# Patient Record
Sex: MALE | Race: WHITE | NOT HISPANIC OR LATINO | Employment: OTHER | URBAN - METROPOLITAN AREA
[De-identification: names, ages, dates, MRNs, and addresses within clinical notes are randomized per-mention and may not be internally consistent; named-entity substitution may affect disease eponyms.]

---

## 2017-02-22 RX ORDER — DULOXETIN HYDROCHLORIDE 60 MG/1
60 CAPSULE, DELAYED RELEASE ORAL DAILY
COMMUNITY

## 2017-02-22 RX ORDER — CELECOXIB 200 MG/1
200 CAPSULE ORAL DAILY
COMMUNITY

## 2017-02-22 RX ORDER — GABAPENTIN 800 MG/1
800 TABLET ORAL 3 TIMES DAILY
COMMUNITY

## 2017-02-22 RX ORDER — LEVOTHYROXINE SODIUM 0.12 MG/1
125 TABLET ORAL DAILY
COMMUNITY

## 2017-02-22 RX ORDER — BACLOFEN 10 MG/1
10 TABLET ORAL 3 TIMES DAILY
COMMUNITY

## 2017-02-22 RX ORDER — HYDROCODONE BITARTRATE AND ACETAMINOPHEN 7.5; 325 MG/1; MG/1
1 TABLET ORAL EVERY 6 HOURS PRN
COMMUNITY

## 2017-02-22 RX ORDER — SILDENAFIL 50 MG/1
50 TABLET, FILM COATED ORAL DAILY PRN
COMMUNITY

## 2017-02-22 RX ORDER — ASPIRIN 325 MG
325 TABLET ORAL DAILY
COMMUNITY

## 2017-02-22 RX ORDER — ATORVASTATIN CALCIUM 40 MG/1
40 TABLET, FILM COATED ORAL DAILY
COMMUNITY

## 2017-02-23 ENCOUNTER — OFFICE VISIT (OUTPATIENT)
Dept: NEUROSURGERY | Facility: CLINIC | Age: 57
End: 2017-02-23

## 2017-02-23 VITALS
WEIGHT: 180 LBS | HEIGHT: 74 IN | BODY MASS INDEX: 23.1 KG/M2 | DIASTOLIC BLOOD PRESSURE: 80 MMHG | SYSTOLIC BLOOD PRESSURE: 126 MMHG | TEMPERATURE: 97.4 F | HEART RATE: 73 BPM

## 2017-02-23 DIAGNOSIS — M79.671 RIGHT FOOT PAIN: ICD-10-CM

## 2017-02-23 DIAGNOSIS — M47.817 LUMBOSACRAL SPONDYLOSIS WITHOUT MYELOPATHY: Primary | ICD-10-CM

## 2017-02-23 DIAGNOSIS — M25.551 RIGHT HIP PAIN: ICD-10-CM

## 2017-02-23 DIAGNOSIS — G89.29 CHRONIC BILATERAL LOW BACK PAIN WITHOUT SCIATICA: ICD-10-CM

## 2017-02-23 DIAGNOSIS — M54.50 CHRONIC BILATERAL LOW BACK PAIN WITHOUT SCIATICA: ICD-10-CM

## 2017-02-23 PROCEDURE — 99244 OFF/OP CNSLTJ NEW/EST MOD 40: CPT | Performed by: NEUROLOGICAL SURGERY

## 2017-02-23 RX ORDER — OXYCODONE HYDROCHLORIDE AND ACETAMINOPHEN 5; 325 MG/1; MG/1
TABLET ORAL
Refills: 0 | COMMUNITY
Start: 2017-02-15

## 2017-02-23 NOTE — PROGRESS NOTES
Patient: Zaid Olivo Jr.  :  1960  Chart #:  [id number]    Date of Service: 17    Chief Complaint:   Chief Complaint   Patient presents with   • Back Pain       Back Pain   Chronicity: Patient is new with me today.  He is a pleasant 56 year old male who presents today with low back pain. The current episode started more than 1 year ago (He has had low back pain for the last 4 years.). The problem occurs constantly (He states his back hurts constantly.). The problem has been gradually worsening since onset. The pain is present in the lumbar spine (The top of his right foot is broken.). The quality of the pain is described as shooting and aching (He has numbness in his right lower extremity that comes and goes.). Radiates to: He complains of right hip pain, and he states that the pain will run down his right lower extremity into the foot. The pain is at a severity of 6/10 (The orthopedic surgeon told him that his right leg was a little shorter than the left.). The pain is mild (His pain is moderate to severe.). The pain is the same all the time. The symptoms are aggravated by sitting, standing and position (His pain increases when he is ambulatory.). Stiffness is present: He has less sensation in the right lower extremity.  He has pain when his lower extremities are raised. Associated symptoms include numbness. Risk factors include sedentary lifestyle and lack of exercise (He has arthritis in his feet.  Currently patient has a boot on h is right foot.). He has tried bed rest (He has not been to physical therapy with little relief.  He has not tried heat or ice.) for the symptoms. The treatment provided mild relief.     He has had low back pain for 4 years;  He has aching pain in the lumbar area constantly;  He has never had lumbar spine surgery;  He has R hip pain and has been diagnosed with a labral tear;  He has had multiple surgeries on the R foot and is currently in a boot.  He c/o numbness  intermittently in the R leg from the thigh distally;  He uses celebrex, gabapentin and percocet.  He has seen spine surgeons in the past who did not recommend surgery.    Radiographic Images:   MRI of the lumbar spine dated 12-12-16 shows straightening of his lumbar curvature.  He has dessication most marked at the lower 4 discs.  There is no spinal stenosis or disc herniation.  He has type II modic changes at L2-L3, L3-L4, and L5-S1 levels.    Past Medical History   Diagnosis Date   • Arthritis    • Carpal tunnel syndrome    • Disease of thyroid gland    • Finger fracture    • Hemorrhoids    • Hyperlipidemia    • Prediabetes      Current Outpatient Prescriptions   Medication Sig Dispense Refill   • aspirin 325 MG tablet Take 325 mg by mouth Daily.     • atorvastatin (LIPITOR) 40 MG tablet Take 40 mg by mouth Daily.     • baclofen (LIORESAL) 10 MG tablet Take 10 mg by mouth 3 (Three) Times a Day.     • celecoxib (CeleBREX) 200 MG capsule Take 200 mg by mouth Daily.     • DULoxetine (CYMBALTA) 60 MG capsule Take 60 mg by mouth Daily.     • gabapentin (NEURONTIN) 800 MG tablet Take 800 mg by mouth 3 (Three) Times a Day.     • HYDROcodone-acetaminophen (NORCO) 7.5-325 MG per tablet Take 1 tablet by mouth Every 6 (Six) Hours As Needed for moderate pain (4-6).     • levothyroxine (SYNTHROID, LEVOTHROID) 125 MCG tablet Take 125 mcg by mouth Daily.     • oxyCODONE-acetaminophen (PERCOCET) 5-325 MG per tablet TK 1 T PO Q 8 H PRF PAIN  0   • sildenafil (VIAGRA) 50 MG tablet Take 50 mg by mouth Daily As Needed for erectile dysfunction.       No current facility-administered medications for this visit.       Allergies   Allergen Reactions   • Penicillins      Social History     Social History   • Marital status:      Spouse name: N/A   • Number of children: N/A   • Years of education: N/A     Social History Main Topics   • Smoking status: Former Smoker   • Smokeless tobacco: None   • Alcohol use No   • Drug use: No   •  "Sexual activity: Not Asked     Other Topics Concern   • None     Social History Narrative     Family History   Problem Relation Age of Onset   • Heart disease Mother    • Diabetes Father    • Diabetes Brother    • Heart disease Brother    • Heart disease Sister      Past Surgical History   Procedure Laterality Date   • Foot surgery       Review of Systems   Constitutional: Positive for activity change.   Musculoskeletal: Positive for arthralgias, back pain, gait problem and myalgias.   Neurological: Positive for tremors and numbness.   All other systems reviewed and are negative.    Vitals:    02/23/17 0920   BP: 126/80   Pulse: 73   Temp: 97.4 °F (36.3 °C)   Weight: 180 lb (81.6 kg)   Height: 74\" (188 cm)     Physical Exam  Neurologic Exam  Physical Exam   Constitutional: The patient is oriented to person, place, and time. Vital signs are normal. The patient appears well-developed and well-nourished and in no distress.   Neat  healthy male  HENT:   Head: Normocephalic.   Right Ear: Hearing normal.   Left Ear: Hearing normal.   Mouth/Throat: Uvula is midline, oropharynx is clear and moist and mucous membranes are normal. The patient has dentures.   Eyes: Conjunctivae, EOM and lids are normal. Pupils are equal, round, and reactive to light.   Fundoscopic exam:  The right eye shows no papilledema.   The left eye shows no papilledema.   Pupils 2 mm; Fundi normal   Neck: Trachea normal and normal range of motion. No thyroid mass present.   Cardiovascular: Regular rhythm and normal heart sounds.   No murmur heard.  Pulses:  Carotid pulses are 2+ on the right side, and 2+ on the left side.  Radial pulses are 2+ on the right side, and 2+ on the left side.   Dorsalis pedis pulse is  2+ on the left side.   Pulmonary/Chest: Effort normal and breath sounds normal.   Musculoskeletal:   Lumbar back: The patient exhibits pain with movement. The patient exhibits decreased range of motion, with straightening and no spasm.   Mild " stiffness; SLR increased low back pain; Hip ROM normal with moderately severe pain on R  There is a boot on the R leg/foot       Neurologic Exam      Mental Status   Oriented to person, place, and time.   Attention: normal. Concentration: normal.   Speech: speech is normal   Level of consciousness: alert  Knowledge: good and consistent with education.   Normal comprehension.      Cranial Nerves   Cranial nerves II through XII intact.      CN III, IV, VI   Pupils are equal, round, and reactive to light.  Extraocular motions are normal.      Motor Exam   Muscle bulk: normal  Overall muscle tone: normal  No Pronator Drift    Strength   Strength 5/5 throughout.      Sensory Exam   Light touch: diminished on R leg from the thigh to foot all over o/w normal  Proprioception normal.      Gait, Coordination, and Reflexes      Gait  Gait: normal     Tremor   Resting tremor: absent  Intention tremor: absent  Action tremor: absent     Reflexes   Right biceps: 2+  Left biceps: 2+  Right triceps: 2+  Left triceps: 2+  Right patellar: 2+  Left patellar: 2+  Right achilles: unable to test  Left achilles: 1+  No Babinski signs  Right Moya: absent  Left Moya: absent  Right ankle clonus: absent  Left ankle clonus: unable to test  JB normal; R handed      Zaid was seen today for back pain.    Diagnoses and all orders for this visit:    Lumbosacral spondylosis without myelopathy    Chronic bilateral low back pain without sciatica    Right hip pain    Right foot pain      Plan: I don't recommend any lumbar spine surgery at this time.  He should continue celebrex and gabapentin.  When able he needs to increase exercise and normalize his gait.  I will see him again prn if he develops any new neurological symptoms.    Isaias Graf MD   Scribed for Isaias Graf MD by RAJIV Hernandez, HELEN @. 2/23/2017  10:23 AM

## 2017-03-29 ENCOUNTER — OFFICE VISIT (OUTPATIENT)
Dept: RETAIL CLINIC | Facility: CLINIC | Age: 57
End: 2017-03-29

## 2017-03-29 VITALS
RESPIRATION RATE: 18 BRPM | DIASTOLIC BLOOD PRESSURE: 80 MMHG | SYSTOLIC BLOOD PRESSURE: 134 MMHG | TEMPERATURE: 98.2 F | OXYGEN SATURATION: 97 % | HEIGHT: 74 IN | HEART RATE: 100 BPM

## 2017-03-29 DIAGNOSIS — J06.9 URI, ACUTE: Primary | ICD-10-CM

## 2017-03-29 PROCEDURE — 99213 OFFICE O/P EST LOW 20 MIN: CPT | Performed by: NURSE PRACTITIONER

## 2017-03-29 RX ORDER — GUAIFENESIN 600 MG/1
1200 TABLET, EXTENDED RELEASE ORAL 2 TIMES DAILY
Qty: 14 TABLET | Refills: 0 | Status: SHIPPED | OUTPATIENT
Start: 2017-03-29 | End: 2017-04-05

## 2017-03-29 RX ORDER — AZITHROMYCIN 250 MG/1
TABLET, FILM COATED ORAL
Qty: 6 TABLET | Refills: 0 | Status: SHIPPED | OUTPATIENT
Start: 2017-03-29

## 2017-03-29 RX ORDER — ALBUTEROL SULFATE 90 UG/1
2 AEROSOL, METERED RESPIRATORY (INHALATION) EVERY 4 HOURS PRN
Qty: 1 INHALER | Refills: 0 | Status: SHIPPED | OUTPATIENT
Start: 2017-03-29

## 2017-03-29 NOTE — PATIENT INSTRUCTIONS
"Upper Respiratory Infection, Adult  Most upper respiratory infections (URIs) are caused by a virus. A URI affects the nose, throat, and upper air passages. The most common type of URI is often called \"the common cold.\"  HOME CARE   · Take medicines only as told by your doctor.  · Gargle warm saltwater or take cough drops to comfort your throat as told by your doctor.  · Use a warm mist humidifier or inhale steam from a shower to increase air moisture. This may make it easier to breathe.  · Drink enough fluid to keep your pee (urine) clear or pale yellow.  · Eat soups and other clear broths.  · Have a healthy diet.  · Rest as needed.  · Go back to work when your fever is gone or your doctor says it is okay.  ¨ You may need to stay home longer to avoid giving your URI to others.  ¨ You can also wear a face mask and wash your hands often to prevent spread of the virus.  · Use your inhaler more if you have asthma.  · Do not use any tobacco products, including cigarettes, chewing tobacco, or electronic cigarettes. If you need help quitting, ask your doctor.  GET HELP IF:  · You are getting worse, not better.  · Your symptoms are not helped by medicine.  · You have chills.  · You are getting more short of breath.  · You have brown or red mucus.  · You have yellow or brown discharge from your nose.  · You have pain in your face, especially when you bend forward.  · You have a fever.  · You have puffy (swollen) neck glands.  · You have pain while swallowing.  · You have white areas in the back of your throat.  GET HELP RIGHT AWAY IF:   · You have very bad or constant:    Headache.    Ear pain.    Pain in your forehead, behind your eyes, and over your cheekbones (sinus pain).    Chest pain.  · You have long-lasting (chronic) lung disease and any of the following:    Wheezing.    Long-lasting cough.    Coughing up blood.    A change in your usual mucus.  · You have a stiff neck.  · You have changes in your:    Vision.   "  Hearing.    Thinking.    Mood.  MAKE SURE YOU:   · Understand these instructions.  · Will watch your condition.  · Will get help right away if you are not doing well or get worse.     This information is not intended to replace advice given to you by your health care provider. Make sure you discuss any questions you have with your health care provider.     Document Released: 06/05/2009 Document Revised: 05/03/2016 Document Reviewed: 03/25/2015  [email or web address] Interactive Patient Education ©2016 [employer]

## 2017-03-29 NOTE — PROGRESS NOTES
Subjective   Zaid Olivo Jr. is a 56 y.o. male.     Chief Complaint   Patient presents with   • Cough   • Nasal Congestion      History of Present Illness   Upper Respiratory Infection  Patient complains of symptoms of a URI. Symptoms include congestion, no  fever, post nasal drip, productive cough with  yellow colored sputum and purulent nasal discharge. Onset of symptoms was 4 days ago, and has been gradually worsening since that time. Treatment to date: cough suppressants.    The following portions of the patient's history were reviewed and updated as appropriate: allergies, current medications, past family history, past medical history, past social history, past surgical history and problem list.      Current Outpatient Prescriptions:   •  atorvastatin (LIPITOR) 40 MG tablet, Take 40 mg by mouth Daily., Disp: , Rfl:   •  albuterol (PROVENTIL HFA;VENTOLIN HFA) 108 (90 BASE) MCG/ACT inhaler, Inhale 2 puffs Every 4 (Four) Hours As Needed for Wheezing., Disp: 1 inhaler, Rfl: 0  •  aspirin 325 MG tablet, Take 325 mg by mouth Daily., Disp: , Rfl:   •  azithromycin (ZITHROMAX Z-YAMILA) 250 MG tablet, Take 2 tablets the first day, then 1 tablet daily for 4 days., Disp: 6 tablet, Rfl: 0  •  baclofen (LIORESAL) 10 MG tablet, Take 10 mg by mouth 3 (Three) Times a Day., Disp: , Rfl:   •  celecoxib (CeleBREX) 200 MG capsule, Take 200 mg by mouth Daily., Disp: , Rfl:   •  DULoxetine (CYMBALTA) 60 MG capsule, Take 60 mg by mouth Daily., Disp: , Rfl:   •  gabapentin (NEURONTIN) 800 MG tablet, Take 800 mg by mouth 3 (Three) Times a Day., Disp: , Rfl:   •  guaiFENesin (MUCINEX) 600 MG 12 hr tablet, Take 2 tablets by mouth 2 (Two) Times a Day for 7 days., Disp: 14 tablet, Rfl: 0  •  HYDROcodone-acetaminophen (NORCO) 7.5-325 MG per tablet, Take 1 tablet by mouth Every 6 (Six) Hours As Needed for moderate pain (4-6)., Disp: , Rfl:   •  levothyroxine (SYNTHROID, LEVOTHROID) 125 MCG tablet, Take 125 mcg by mouth Daily., Disp: , Rfl:  "  •  oxyCODONE-acetaminophen (PERCOCET) 5-325 MG per tablet, TK 1 T PO Q 8 H PRF PAIN, Disp: , Rfl: 0  •  sildenafil (VIAGRA) 50 MG tablet, Take 50 mg by mouth Daily As Needed for erectile dysfunction., Disp: , Rfl:     /80  Pulse 100  Temp 98.2 °F (36.8 °C) (Temporal Artery )   Resp 18  Ht 74\" (188 cm)  SpO2 97%    Review of Systems   Constitutional: Negative for chills and fever.   HENT: Positive for postnasal drip, rhinorrhea and sore throat (scratchy). Negative for ear pain and sinus pressure.    Respiratory: Positive for cough and wheezing (at times). Negative for chest tightness and shortness of breath.    Skin: Negative for color change.   Neurological: Negative for dizziness and headaches.      Allergies   Allergen Reactions   • Penicillins      Physical Exam   Constitutional: He is oriented to person, place, and time. He appears well-developed and well-nourished.   HENT:   Head: Normocephalic.   Right Ear: Tympanic membrane normal.   Left Ear: Tympanic membrane normal.   Mouth/Throat: No posterior oropharyngeal erythema.   Eyes: Conjunctivae are normal.   Cardiovascular: Regular rhythm.    Pulmonary/Chest: Effort normal. He has decreased breath sounds in the right lower field and the left lower field. He has no wheezes. He has no rhonchi.   Neurological: He is alert and oriented to person, place, and time.   Skin: Skin is warm and dry.     Assessment/Plan     Zaid was seen today for cough and nasal congestion.    Diagnoses and all orders for this visit:    URI, acute  -     azithromycin (ZITHROMAX Z-YAMILA) 250 MG tablet; Take 2 tablets the first day, then 1 tablet daily for 4 days.  -     albuterol (PROVENTIL HFA;VENTOLIN HFA) 108 (90 BASE) MCG/ACT inhaler; Inhale 2 puffs Every 4 (Four) Hours As Needed for Wheezing.  -     guaiFENesin (MUCINEX) 600 MG 12 hr tablet; Take 2 tablets by mouth 2 (Two) Times a Day for 7 days.         Discussed treatment plan. D/C instructions provided.   Follow up with " PCP or at the Urgent Care if symptoms worsen or fail to improve within next 48-72 hours.  Patient teaching information discussed and provided to the patient. Patient verbalized understanding.      March 29, 2017 9:02 AM

## 2017-12-24 DIAGNOSIS — J06.9 URI, ACUTE: ICD-10-CM

## 2019-06-11 ENCOUNTER — TRANSCRIBE ORDERS (OUTPATIENT)
Dept: ADMINISTRATIVE | Facility: HOSPITAL | Age: 59
End: 2019-06-11

## 2019-06-11 DIAGNOSIS — M54.50 LOW BACK PAIN WITHOUT SCIATICA, UNSPECIFIED BACK PAIN LATERALITY, UNSPECIFIED CHRONICITY: Primary | ICD-10-CM

## 2019-06-12 ENCOUNTER — TRANSCRIBE ORDERS (OUTPATIENT)
Dept: ADMINISTRATIVE | Facility: HOSPITAL | Age: 59
End: 2019-06-12

## 2019-06-12 DIAGNOSIS — M54.5 LOW BACK PAIN, UNSPECIFIED BACK PAIN LATERALITY, UNSPECIFIED CHRONICITY, WITH SCIATICA PRESENCE UNSPECIFIED: Primary | ICD-10-CM

## 2019-06-14 ENCOUNTER — APPOINTMENT (OUTPATIENT)
Dept: MRI IMAGING | Facility: HOSPITAL | Age: 59
End: 2019-06-14

## 2019-06-19 ENCOUNTER — HOSPITAL ENCOUNTER (OUTPATIENT)
Dept: CT IMAGING | Facility: HOSPITAL | Age: 59
Discharge: HOME | End: 2019-06-19
Admitting: PAIN MEDICINE

## 2019-06-19 DIAGNOSIS — M54.5 LOW BACK PAIN, UNSPECIFIED BACK PAIN LATERALITY, UNSPECIFIED CHRONICITY, WITH SCIATICA PRESENCE UNSPECIFIED: ICD-10-CM

## 2019-06-19 PROCEDURE — 72131 CT LUMBAR SPINE W/O DYE: CPT | Performed by: RADIOLOGY

## 2019-06-19 PROCEDURE — 72131 CT LUMBAR SPINE W/O DYE: CPT

## 2019-10-24 ENCOUNTER — OFFICE VISIT CONVERTED (OUTPATIENT)
Dept: NEUROSURGERY | Facility: CLINIC | Age: 59
End: 2019-10-24
Attending: PHYSICIAN ASSISTANT

## 2019-10-24 ENCOUNTER — TRANSCRIBE ORDERS (OUTPATIENT)
Dept: ADMINISTRATIVE | Facility: HOSPITAL | Age: 59
End: 2019-10-24

## 2019-10-24 DIAGNOSIS — M54.2 CERVICALGIA: Primary | ICD-10-CM

## 2019-10-25 ENCOUNTER — HOSPITAL ENCOUNTER (OUTPATIENT)
Dept: CT IMAGING | Facility: HOSPITAL | Age: 59
Discharge: HOME | End: 2019-10-25
Admitting: PHYSICIAN ASSISTANT

## 2019-10-25 DIAGNOSIS — M54.2 CERVICALGIA: ICD-10-CM

## 2019-10-25 PROCEDURE — 72125 CT NECK SPINE W/O DYE: CPT | Performed by: RADIOLOGY

## 2019-10-25 PROCEDURE — 72125 CT NECK SPINE W/O DYE: CPT

## 2020-02-13 ENCOUNTER — OFFICE VISIT CONVERTED (OUTPATIENT)
Dept: NEUROSURGERY | Facility: CLINIC | Age: 60
End: 2020-02-13
Attending: NEUROLOGICAL SURGERY

## 2020-10-20 ENCOUNTER — OFFICE VISIT (OUTPATIENT)
Dept: ORTHOPEDIC SURGERY | Facility: CLINIC | Age: 60
End: 2020-10-20

## 2020-10-20 VITALS
DIASTOLIC BLOOD PRESSURE: 81 MMHG | WEIGHT: 160 LBS | TEMPERATURE: 98 F | HEIGHT: 74 IN | BODY MASS INDEX: 20.53 KG/M2 | HEART RATE: 94 BPM | SYSTOLIC BLOOD PRESSURE: 145 MMHG

## 2020-10-20 DIAGNOSIS — M19.022 LOCALIZED PRIMARY OSTEOARTHRITIS OF LEFT ELBOW: Primary | ICD-10-CM

## 2020-10-20 PROCEDURE — 99203 OFFICE O/P NEW LOW 30 MIN: CPT | Performed by: PHYSICIAN ASSISTANT

## 2020-10-20 NOTE — PROGRESS NOTES
New Patient Visit      Patient: Zaid Olivo Jr.  YOB: 1960  Date of Encounter: 10/20/2020          History of Present Illness:     Zaid Olivo Jr. is a 60 y.o. male evaluated today secondary to an approximate 1 week history of acute fall #2 patient states that his foot gave out causing him to fall landing directly onto the posterior elbow.  Since that time he has had stiffness and pain with difficulty upon obtaining full extension.  Patient describes several year history of pain into the elbow with progressive symptoms.  This last exacerbation has caused difficulty with inability to fully extend.  Patient has attempted activity modification and rest with no benefit.  He presents today for evaluation [facility] as well as seeking treatment to reduce pain and symptoms.  He describes this as a dull throbbing aching sensation to the posterior lateral aspect of the elbow with no radiation.  The patient experiences no paresthesias.           There is no problem list on file for this patient.    Past Medical History:   Diagnosis Date   • Arthritis    • Carpal tunnel syndrome    • Coronary artery disease    • Disease of thyroid gland    • Finger fracture    • Gout    • Hemorrhoids    • High cholesterol    • Hyperlipidemia    • Osteoarthritis    • Osteomyelitis (CMS/HCC)    • Osteoporosis    • Prediabetes      Past Surgical History:   Procedure Laterality Date   • BACK SURGERY     • FOOT SURGERY       Social History     Occupational History   • Not on file   Tobacco Use   • Smoking status: Current Every Day Smoker     Packs/day: 10.00   • Smokeless tobacco: Never Used   • Tobacco comment: has tried   Substance and Sexual Activity   • Alcohol use: No   • Drug use: No   • Sexual activity: Defer      Social History     Social History Narrative   • Not on file     Family History   Problem Relation Age of Onset   • Heart disease Mother    • Osteoarthritis Mother    • Osteoporosis Mother   "  • Diabetes Mother    • Diabetes Father    • Gout Father    • Cancer Father    • Heart disease Father    • Diabetes Brother    • Heart disease Brother    • Heart disease Sister      Current Outpatient Medications   Medication Sig Dispense Refill   • atorvastatin (LIPITOR) 40 MG tablet Take 40 mg by mouth Daily.     • HYDROcodone-acetaminophen (NORCO) 7.5-325 MG per tablet Take 1 tablet by mouth Every 6 (Six) Hours As Needed for moderate pain (4-6).     • levothyroxine (SYNTHROID, LEVOTHROID) 125 MCG tablet Take 125 mcg by mouth Daily.     • albuterol (PROVENTIL HFA;VENTOLIN HFA) 108 (90 BASE) MCG/ACT inhaler Inhale 2 puffs Every 4 (Four) Hours As Needed for Wheezing. 1 inhaler 0   • aspirin 325 MG tablet Take 325 mg by mouth Daily.     • azithromycin (ZITHROMAX Z-YAMILA) 250 MG tablet Take 2 tablets the first day, then 1 tablet daily for 4 days. 6 tablet 0   • baclofen (LIORESAL) 10 MG tablet Take 10 mg by mouth 3 (Three) Times a Day.     • celecoxib (CeleBREX) 200 MG capsule Take 200 mg by mouth Daily.     • DULoxetine (CYMBALTA) 60 MG capsule Take 60 mg by mouth Daily.     • gabapentin (NEURONTIN) 800 MG tablet Take 800 mg by mouth 3 (Three) Times a Day.     • oxyCODONE-acetaminophen (PERCOCET) 5-325 MG per tablet TK 1 T PO Q 8 H PRF PAIN  0   • sildenafil (VIAGRA) 50 MG tablet Take 50 mg by mouth Daily As Needed for erectile dysfunction.       No current facility-administered medications for this visit.      Allergies   Allergen Reactions   • Penicillins Other (See Comments)     Unsure reaction            Review of Systems   Musculoskeletal: Positive for back pain and joint pain.        Pertinent positives listed in HPI  Gait problem secondary to foot problems   Neurological: Positive for focal weakness and numbness.   All other systems reviewed and are negative.      Vitals:    10/20/20 1305   BP: 145/81   Pulse: 94   Temp: 98 °F (36.7 °C)   Weight: 72.6 kg (160 lb)   Height: 188 cm (74\")     Patient's Body mass " index is 20.54 kg/m². BMI is within normal parameters. No follow-up required..    Zaid Olivo Jr.  reports that he has been smoking. He has been smoking about 10.00 packs per day. He has never used smokeless tobacco.. I have educated him on the risk of diseases from using tobacco products such as cancer, COPD and heart disease.     I advised him to quit and he is not willing to quit.          Physical Exam: 60 y.o. male .  General Appearance:    Well appearing, cooperative, in no acute distress.       Patient is alert and oriented x 3.            Musculoskeletal: Left Elbow Exam     Tenderness   The patient is experiencing tenderness in the lateral epicondyle, radial capitellar joint and olecranon fossa.     Range of Motion   Extension: -10   Flexion: 90   Pronation: normal   Supination: normal     Muscle Strength   The patient has normal left elbow strength.    Tests   Varus: negative  Valgus: negative  Tinel's sign (cubital tunnel): negative    Other   Erythema: absent  Sensation: normal  Pulse: present                Radiology:   X-ray: 2 views left elbow reveals radial ulnar as well as radial humeral joint narrowing with osteophyte formations off of the radial head the trochlea.  No acute fractures dislocations noted      Assesment:    ICD-10-CM ICD-9-CM   1. Localized primary osteoarthritis of left elbow  M19.022 715.12         Plan:    Discussion was had with the patient and today he was provided with a prescription for Naprosyn 500 mg 1 p.o. twice daily dispense #60 with 1 refill.  The patient was given an order for formal outpatient physical therapy with emphasis on increasing range of motion and decreasing pain and symptoms.  The patient will implement modalities as deemed appropriate per therapist discretion.  The patient will return back in 4 weeks for further evaluation.  There is no acute surgical indication.    This document was signed by JOSE Downey October 20, 2020     CC: Reinaldo Flynn,  MD

## 2021-05-11 NOTE — H&P
History and Physical      Patient Name: Zaid Olivo   Patient ID: [id number]   Sex: Male   YOB: 1960    Referring Provider: Monalisa LINARES    Visit Date: October 24, 2019    Provider: Elsy Farmer PA-C   Location: [facility]   Location Address: [address] [place] [address]  [id number]   Location Phone: [phone]          Chief Complaint     Patient is here with complaints of low back pain. CT Scan at [facility] Not loaded to PACS       History Of Present Illness  The patient is a 59 year old male, who presents on referral from Monalisa LINARSE, for a neurosurgical evaluation of low back pain, right leg pain, and paresthesias in the right leg.   The right leg pain involves the right lower leg in a nonspecific distribution. The pain developed gradually several years ago. It is 8/10 in severity has an aching quality and radiates into the right lower leg in a nonspecific distribution. The pain has been constant. The pain tends to be maximal at no specific time, but waxes and wanes in severity throughout the day. The patient states the pain is aggravated by prolonged sitting, prolonged standing, and walking long distances. It is alleviated by rest and pain medication. The paresthesias are localized to the right lower leg in a nonspecific distribution.   He denies bowel or bladder dysfunction. The patient's past medical history is notable for SCS in low back placed last year.   RECENT INTERVENTIONS:  He has been previously treated with epidural steroids and pain medication. The epidural steroids were ineffective.   INFORMATION REVIEWED:  The following information was reviewed: radiology reports and images. MRI of the lumbar spine revealed spinal stenosis and facet arthropathy. The stenosis is present at L5-S1, L4-5, L3-4, and L2-3. The facet arthropathy is present at multiple levels.      Pt also complains of weakness in arms and neck pain with jerking in right leg.        Past Medical History  Degenerative Disc Disease ; Hyperlipidemia; Lumbago/low back pain         Past Surgical History  Spinal Cord Stimulator         Medication List  aspirin 81 mg oral tablet,delayed release (DR/EC); Lipitor 40 mg oral tablet; Percocet  mg oral tablet; Synthroid 125 mcg oral tablet         Allergy List  PENICILLINS         Family Medical History  Family history of Arthritis; Family history of heart disease; Family history of diabetes mellitus; Family history of osteoporosis         Social History  Alcohol (Never); lives with spouse; ; Retired; Tobacco (Current every day)         Review of Systems  · Constitutional  o Denies  o : chills, excessive sweating, fatigue, fever, sycope/passing out, weight gain, weight loss  · Eyes  o Denies  o : changes in vision, blurry vision, double vision  · HENT  o Denies  o : loss of hearing, ringing in the ears, ear aches, sore throat, nasal congestion, sinus pain, nose bleeds, seasonal allergies  · Cardiovascular  o Denies  o : blood clots, swollen legs, anemia, easy burising or bleeding, transfusions  · Respiratory  o Denies  o : shortness of breath, dry cough, productive cough, pneumonia, COPD  · Gastrointestinal  o Denies  o : difficulty swallowing, reflux  · Genitourinary  o Admits  o : incontinence  · Neurologic  o Admits  o : loss of balance, numbness/tingling/paresthesia , difficulty with dexterity  o Denies  o : headache, seizure, stroke, tremor, falls, dizziness/vertigo, difficulty with sleep, difficulty with coordination, weakness  · Musculoskeletal  o Admits  o : sciatica, low back pain  o Denies  o : neck stiffness/pain, swollen lymph nodes, muscle aches, joint pain, weakness, spasms, pain radiating in arm, pain radiating in leg  · Endocrine  o Denies  o : diabetes, thyroid disorder  · Psychiatric  o Denies  o : anxiety, depression      Vitals  Date Time BP Position Site L\R Cuff Size HR RR TEMP (F) WT  HT  BMI kg/m2 BSA m2 O2 Sat HC  "      10/24/2019 08:30 AM      91 - R 91  250lbs 16oz 6'  2\" 32.23 2.44           Physical Examination  · Constitutional  o Appearance  o : well-nourished, well developed, alert, in no acute distress  · Respiratory  o Respiratory Effort  o : breathing unlabored  · Cardiovascular  o Peripheral Vascular System  o :   § Extremities  § : no edema or cyanosis  · Musculoskeletal  o Spine  o :   § Inspection/Palpation  § : tenderness to palpation present, diffusely in lower back and in facet joints. Tenderness in neck. Battery located in low back for SCS.   o Right Lower Extremity  o :   § Inspection/Palpation  § : no joint or limb tenderness to palpation, no edema present, no ecchymosis  § Joint Stability  § : joint stability within normal limits  § Range of Motion  § : range of motion normal, no joint crepitations present, no pain on motion, Nick's test negative  o Left Lower Extremity  o :   § Inspection/Palpation  § : no joint or limb tenderness to palpation, no edema present, no ecchymosis  § Joint Stability  § : joint stability within normal limits  § Range of Motion  § : range of motion normal, no joint crepitations present, no pain on motion, Nick's test negative  · Skin and Subcutaneous Tissue  o Extremities  o :   § Right Lower Extremity  § : no lesions or areas of discoloration  § Left Lower Extremity  § : no lesions or areas of discoloration  o Back  o : no lesions or areas of discoloration  · Neurologic  o Mental Status Examination  o :   § Orientation  § : alert and oriented to time, person, place and events  o Motor Examination  o :   § RLE Strength  § : strength normal  § RLE Motor Function  § : tone normal, no atrophy, no abnormal movements noted  § LLE Strength  § : strength normal  § LLE Motor Function  § : tone normal, no atrophy, no abnormal movements noted  o Reflexes  o :   § RLE  § : knee and ankle reflexes 2/4, SLR negative  § LLE  § : knee and ankle reflexes 2/4, SLR " negative  o Sensation  o :   § Light Touch  § : altered sensation in right lower leg.   o Gait and Station  o :   § Gait Screening  § : normal gait, able to stand without difficulty  · Psychiatric  o Mood and Affect  o : mood normal, affect appropriate     Weakness in arms and right leg.               Assessment  · Lumbago/low back pain     724.3/M54.40  · Paresthesia     782.0/R20.2  · Sciatica     724.3/M54.30  · Cervicalgia     723.1/M54.2  · Weakness     780.79/R53.1  arms/right leg      Plan  · Orders  o CT Cervical Spine without IV Contrast [facility] (48447) - 723.1/M54.2, 780.79/R53.1 - 10/24/2019  · Medications  o Medications have been Reconciled  o Transition of Care or Provider Policy  · Instructions  o The ROS and the PFSH were reviewed at today's visit.  o I have discussed the risks and benefits of surgery versus physical therapy, epidural steroids, and other conservative forms of treatment.  o Handouts provided: Non-Surgical Treatments for Back Pain/Degenerative Disc Disease.  o We have discussed the benefits of core strengthening. Patient will work on improving the core muscle strength with low impact activities such as walking, swimming, Pilates, etc.   o Call or return to office if symptoms worsen or persist.  o Encouraged to follow-up with Primary Care Provider for preventative care.  o Will send for RFA in bilateral lower back. Return to us afterwards. Will also order CT scan of neck due to arm weakness and neck pain. Pt has a SCS in lower back but has right leg pain and lower back pain. Return to discuss options.   o Electronically Identified Patient Education Materials Provided Electronically  · Disposition  o Call or Return if symptoms worsen or persist.            Electronically Signed by: Elsy Farmer PA-C -Author on October 24, 2019 09:16:16 AM

## 2021-05-14 NOTE — PROGRESS NOTES
Progress Note      Patient Name: Zaid Olivo   Patient ID: [id number]   Sex: Male   YOB: 1960    Referring Provider: Monalisa LINARES    Visit Date: February 13, 2020    Provider: Etienne Dorsey MD   Location: [facility]   Location Address: [address] [place] [address]  [id number]   Location Phone: [phone]          Chief Complaint  · Follow-up     Here to discuss CT. Complains of low back and neck pain.       History Of Present Illness  The patient is a 59 year old male who is in the office for followup appointment. He has lower back pain and cervical pain. The cervical CT shows some foraminal stenosis, but no significant canal stenosis.       Past Medical History  Degenerative Disc Disease ; Hyperlipidemia; Lumbago/low back pain         Past Surgical History  Spinal Cord Stimulator         Medication List  aspirin 81 mg oral tablet,delayed release (DR/EC); Fosamax 70 mg oral tablet; Lipitor 40 mg oral tablet; Percocet  mg oral tablet; Synthroid 125 mcg oral tablet; Vitamin D2 50,000 unit oral capsule         Allergy List  PENICILLINS         Family Medical History  Family history of Arthritis; Family history of heart disease; Family history of diabetes mellitus; Family history of osteoporosis         Social History  Alcohol (Never); lives with spouse; ; Retired; Tobacco (Current every day)         Review of Systems  · Constitutional  o Admits  o : weight loss  o Denies  o : chills, excessive sweating, fatigue, fever, sycope/passing out, weight gain  · Eyes  o Denies  o : changes in vision, blurry vision, double vision  · HENT  o Denies  o : loss of hearing, ringing in the ears, ear aches, sore throat, nasal congestion, sinus pain, nose bleeds, seasonal allergies  · Cardiovascular  o Admits  o : easy burising or bleeding  o Denies  o : blood clots, swollen legs, anemia, transfusions  · Respiratory  o Denies  o : shortness of breath, dry cough, productive  "cough, pneumonia, COPD  · Gastrointestinal  o Denies  o : difficulty swallowing, reflux  · Genitourinary  o Denies  o : incontinence  · Neurologic  o Admits  o : loss of balance, falls, numbness/tingling/paresthesia , weakness  o Denies  o : headache, seizure, stroke, tremor, dizziness/vertigo, difficulty with sleep, difficulty with coordination, difficulty with dexterity  · Musculoskeletal  o Admits  o : neck stiffness/pain, joint pain, weakness, pain radiating in arm, pain radiating in leg, low back pain  o Denies  o : swollen lymph nodes, muscle aches, spasms, sciatica  · Endocrine  o Denies  o : diabetes, thyroid disorder  · Psychiatric  o Denies  o : anxiety, depression  · All Others Negative      Vitals  Date Time BP Position Site L\R Cuff Size HR RR TEMP (F) WT  HT  BMI kg/m2 BSA m2 O2 Sat HC       02/13/2020 02:23 /71 Sitting       155lbs 16oz 6'  2\" 20.03 1.92           Physical Examination  · Constitutional  o Appearance  o : well-nourished, well developed, alert, in no acute distress  · Respiratory  o Respiratory Effort  o : breathing unlabored  · Cardiovascular  o Peripheral Vascular System  o :   § Extremities  § : no edema or cyanosis  · Psychiatric  o Mood and Affect  o : mood normal, affect appropriate              Assessment  · Lumbago/low back pain     724.3/M54.40  · Paresthesia     782.0/R20.2  · Sciatica     724.3/M54.30  · Cervicalgia     723.1/M54.2  · Weakness     780.79/R53.1  arms/right leg    Problems Reconciled  Plan  · Medications  o Medications have been Reconciled  o Transition of Care or Provider Policy  · Instructions  o I have recommended smoking cessation and core strengthening. Surgery would not likely help the neck or lower back pain.            Electronically Signed by: Etienne Dorsey MD -Author on February 13, 2020 02:46:32 PM  "

## 2021-05-15 VITALS
SYSTOLIC BLOOD PRESSURE: 123 MMHG | DIASTOLIC BLOOD PRESSURE: 71 MMHG | BODY MASS INDEX: 20.02 KG/M2 | WEIGHT: 156 LBS | HEIGHT: 74 IN

## 2021-05-15 VITALS — WEIGHT: 251 LBS | HEIGHT: 74 IN | BODY MASS INDEX: 32.21 KG/M2 | HEART RATE: 91 BPM

## 2021-05-17 ENCOUNTER — TRANSCRIBE ORDERS (OUTPATIENT)
Dept: ADMINISTRATIVE | Facility: HOSPITAL | Age: 61
End: 2021-05-17

## 2021-05-17 DIAGNOSIS — Z01.818 OTHER SPECIFIED PRE-OPERATIVE EXAMINATION: Primary | ICD-10-CM

## 2021-05-18 ENCOUNTER — LAB (OUTPATIENT)
Dept: LAB | Facility: HOSPITAL | Age: 61
End: 2021-05-18

## 2021-05-18 DIAGNOSIS — Z01.818 OTHER SPECIFIED PRE-OPERATIVE EXAMINATION: ICD-10-CM

## 2021-05-18 LAB — SARS-COV-2 RNA NOSE QL NAA+PROBE: NOT DETECTED

## 2021-05-18 PROCEDURE — U0005 INFEC AGEN DETEC AMPLI PROBE: HCPCS

## 2021-05-18 PROCEDURE — U0004 COV-19 TEST NON-CDC HGH THRU: HCPCS

## 2021-05-18 PROCEDURE — C9803 HOPD COVID-19 SPEC COLLECT: HCPCS

## 2024-10-25 ENCOUNTER — LAB (OUTPATIENT)
Dept: LAB | Facility: HOSPITAL | Age: 64
End: 2024-10-25
Payer: MEDICARE

## 2024-10-25 ENCOUNTER — OFFICE VISIT (OUTPATIENT)
Dept: UROLOGY | Facility: CLINIC | Age: 64
End: 2024-10-25
Payer: MEDICARE

## 2024-10-25 VITALS
SYSTOLIC BLOOD PRESSURE: 122 MMHG | DIASTOLIC BLOOD PRESSURE: 56 MMHG | TEMPERATURE: 98.4 F | HEART RATE: 54 BPM | HEIGHT: 74 IN | OXYGEN SATURATION: 97 % | WEIGHT: 155 LBS | BODY MASS INDEX: 19.89 KG/M2

## 2024-10-25 DIAGNOSIS — N42.9 PROSTATE DISORDER: ICD-10-CM

## 2024-10-25 DIAGNOSIS — R79.89 LOW TESTOSTERONE IN MALE: ICD-10-CM

## 2024-10-25 DIAGNOSIS — R79.89 LOW TESTOSTERONE IN MALE: Primary | ICD-10-CM

## 2024-10-25 DIAGNOSIS — N52.9 ERECTILE DYSFUNCTION, UNSPECIFIED ERECTILE DYSFUNCTION TYPE: ICD-10-CM

## 2024-10-25 PROBLEM — M48.061 SPINAL STENOSIS OF LUMBAR REGION: Status: ACTIVE | Noted: 2019-10-28

## 2024-10-25 PROBLEM — M47.816 LUMBAR SPONDYLOSIS: Status: ACTIVE | Noted: 2020-02-24

## 2024-10-25 PROBLEM — M25.512 PAIN IN JOINT OF LEFT SHOULDER: Status: ACTIVE | Noted: 2021-10-05

## 2024-10-25 PROBLEM — M17.12 OSTEOARTHRITIS OF LEFT KNEE: Status: ACTIVE | Noted: 2022-05-31

## 2024-10-25 PROBLEM — G62.9 PERIPHERAL POLYNEUROPATHY: Status: ACTIVE | Noted: 2022-11-14

## 2024-10-25 PROBLEM — N48.6 CHRONIC INFLAMMATION OF TUNICA ALBUGINEA: Status: ACTIVE | Noted: 2024-10-25

## 2024-10-25 PROBLEM — M47.812 CERVICAL SPONDYLOSIS WITHOUT MYELOPATHY: Status: ACTIVE | Noted: 2020-02-24

## 2024-10-25 PROBLEM — M16.9 OSTEOARTHRITIS OF HIP: Status: ACTIVE | Noted: 2022-12-06

## 2024-10-25 PROBLEM — E78.5 HYPERLIPIDEMIA: Status: ACTIVE | Noted: 2024-10-25

## 2024-10-25 PROBLEM — M50.30 DDD (DEGENERATIVE DISC DISEASE), CERVICAL: Status: ACTIVE | Noted: 2020-02-24

## 2024-10-25 PROBLEM — G89.4 CHRONIC PAIN DISORDER: Status: ACTIVE | Noted: 2022-11-14

## 2024-10-25 LAB
HCT VFR BLD AUTO: 40.5 % (ref 37.5–51)
HGB BLD-MCNC: 13.4 G/DL (ref 13–17.7)
LH SERPL-ACNC: 3.21 MIU/ML
PSA SERPL-MCNC: 0.88 NG/ML (ref 0–4)

## 2024-10-25 PROCEDURE — 84403 ASSAY OF TOTAL TESTOSTERONE: CPT

## 2024-10-25 PROCEDURE — 85014 HEMATOCRIT: CPT

## 2024-10-25 PROCEDURE — 85018 HEMOGLOBIN: CPT

## 2024-10-25 PROCEDURE — 36415 COLL VENOUS BLD VENIPUNCTURE: CPT

## 2024-10-25 PROCEDURE — 84153 ASSAY OF PSA TOTAL: CPT

## 2024-10-25 PROCEDURE — 84402 ASSAY OF FREE TESTOSTERONE: CPT

## 2024-10-25 PROCEDURE — 83002 ASSAY OF GONADOTROPIN (LH): CPT

## 2024-10-25 RX ORDER — LEVOTHYROXINE SODIUM 125 UG/1
TABLET ORAL
COMMUNITY
End: 2024-10-25 | Stop reason: SDUPTHER

## 2024-10-25 RX ORDER — ALENDRONATE SODIUM 70 MG/1
TABLET ORAL
COMMUNITY

## 2024-10-25 RX ORDER — SILDENAFIL 25 MG/1
25 TABLET, FILM COATED ORAL DAILY PRN
Qty: 30 TABLET | Refills: 5 | Status: SHIPPED | OUTPATIENT
Start: 2024-10-25

## 2024-10-25 NOTE — PROGRESS NOTES
Office Visit Low Testosterone      Patient Name: Zaid Olivo Jr.  : 1960   MRN: [id number]     Chief Complaint:   Chief Complaint   Patient presents with    [facility]     Testosterone         Referring Provider: Ella Turner APRN    History of Present Illness: Zaid Olivo Jr. is a 64 y.o. male who presents today with low testosterone and ED.  The serum test was collected due to the following complaints:     Reports low libido     Reports low energy     Reports poor sleep     Denies mental clarity issues    Denies history of depression    Reports erectile dysfunction   Denies breast tenderness or growth   Denies history of blood clots               Denies history of stroke                       Patient does not have potential interest in conception, has 5 children s/p vasectomy    Objective      Review of System:   As noted in HPI.    Past Medical History:   Past Medical History:   Diagnosis Date    Arthritis     Carpal tunnel syndrome     Coronary artery disease     Disease of thyroid gland     Erectile dysfunction     Finger fracture     Gout     Hemorrhoids     High cholesterol     Hyperlipidemia     Osteoarthritis     Osteomyelitis     Osteoporosis     Prediabetes        Past Surgical History:   Past Surgical History:   Procedure Laterality Date    BACK SURGERY      FOOT SURGERY      VASECTOMY         Family History:   Family History   Problem Relation Age of Onset    Heart disease Mother     Osteoarthritis Mother     Osteoporosis Mother     Diabetes Mother     Diabetes Father     Gout Father     Cancer Father     Heart disease Father     Diabetes Brother     Heart disease Brother     Heart disease Sister        Social History:   Social History     Socioeconomic History    Marital status:    Tobacco Use    Smoking status: Every Day     Current packs/day: 1.00     Average packs/day: 1 pack/day for 51.8 years (51.8 ttl pk-yrs)     Types: Cigarettes     Start date:       "Passive exposure: Current    Smokeless tobacco: Never    Tobacco comments:     has tried   Vaping Use    Vaping status: Never Used   Substance and Sexual Activity    Alcohol use: No    Drug use: No    Sexual activity: Not Currently     Partners: Female     Birth control/protection: Vasectomy       Medications:     Current Outpatient Medications:     alendronate (Fosamax) 70 MG tablet, Fosamax 70 mg oral tablet take 1 tablet (70 mg) by oral route once weekly in the morning, at least 30 min before first food, beverage, or medication of day   Active, Disp: , Rfl:     aspirin 325 MG tablet, Take 1 tablet by mouth Daily., Disp: , Rfl:     atorvastatin (LIPITOR) 40 MG tablet, Take 1 tablet by mouth Daily., Disp: , Rfl:     DULoxetine (CYMBALTA) 60 MG capsule, Take 1 capsule by mouth Daily., Disp: , Rfl:     HYDROcodone-acetaminophen (NORCO) 7.5-325 MG per tablet, Take 1 tablet by mouth Every 6 (Six) Hours As Needed for Moderate Pain., Disp: , Rfl:     levothyroxine (SYNTHROID, LEVOTHROID) 125 MCG tablet, Take 1 tablet by mouth Daily., Disp: , Rfl:     oxyCODONE-acetaminophen (PERCOCET) 5-325 MG per tablet, TK 1 T PO Q 8 H PRF PAIN, Disp: , Rfl: 0    sildenafil (Viagra) 25 MG tablet, Take 1 tablet by mouth Daily As Needed for Erectile Dysfunction. Take 1 hour prior to intercourse on empty stomach, Disp: 30 tablet, Rfl: 5    Allergies:   Allergies   Allergen Reactions    Penicillins Other (See Comments)     Unsure reaction       Objective     Physical Exam:   Vital Signs:   Vitals:    10/25/24 0845   BP: 122/56   Pulse: 54   Temp: 98.4 °F (36.9 °C)   SpO2: 97%   Weight: 70.3 kg (155 lb)   Height: 188 cm (74.02\")     Body mass index is 19.89 kg/m².   Physical Exam  Vitals and nursing note reviewed. Exam conducted with a chaperone present.   Constitutional:       General: He is awake. He is not in acute distress.     Appearance: He is not ill-appearing.   Pulmonary:      Effort: Pulmonary effort is normal.   Abdominal:      " "Hernia: There is no hernia in the left inguinal area or right inguinal area.   Genitourinary:     Penis: Normal.       Testes:         Right: Varicocele present. Mass, tenderness, swelling or testicular hydrocele not present. Right testis is descended. Cremasteric reflex is present.          Left: Mass, tenderness, swelling, testicular hydrocele or varicocele not present. Left testis is descended. Cremasteric reflex is present.       Epididymis:      Right: Normal.      Left: Normal.   Skin:     General: Skin is warm and dry.   Neurological:      Mental Status: He is alert and oriented to person, place, and time. Mental status is at baseline.   Psychiatric:         Mood and Affect: Mood normal.         Behavior: Behavior is cooperative.            Labs  Lab Results   Component Value Date    TESTOSTERONE 269 04/09/2014       Lab Results   Component Value Date    PSA 1.43 04/09/2014       No results found for: \"WBC\", \"HGB\", \"HCT\", \"MCV\", \"PLT\"    I have reviewed the above labs.    Assessment / Plan    Assessment    Diagnoses and all orders for this visit:    1. Low testosterone in male (Primary)  -     Hemoglobin & Hematocrit, Blood; Future  -     Luteinizing Hormone; Future  -     PSA DIAGNOSTIC; Future  -     Testosterone, Free, Total; Future    2. Erectile dysfunction, unspecified erectile dysfunction type  -     sildenafil (Viagra) 25 MG tablet; Take 1 tablet by mouth Daily As Needed for Erectile Dysfunction. Take 1 hour prior to intercourse on empty stomach  Dispense: 30 tablet; Refill: 5    3. Prostate disorder  -     PSA DIAGNOSTIC; Future         Mr. Olivo is a 64 y.o. male with low testosterone.    Labs obtained 09/04/24 at 0739  Total Testosterone: 215  Free Testosterone: 0.6  Hematocrit:  40  Hemoglobin:  13.2    PSA 0.9 on 09/07/23-denies family history of prostate cancer    Today we discussed the role testosterone plays for a male patient. I have indicated that low testosterone can be associated with " metabolic syndrome, erectile dysfunction, coronary artery disease, diabetes, depression, osteoporosis, fatigue, low sex drive, poor sleep, high cholesterol, increased abdominal fat, muscle loss, irritability, hot flashes, and inability to concentrate.     Treatment options were discussed including SERMs, aromatase inhibitors, topical gel or patch, oral troches, nasal spray, testosterone injections as frequent as weekly, long-acting injections every 10 weeks, and testosterone pellets placed in clinic every 4-6 months.    We discussed testosterone therapy is a life long therapy and compliance with labs and visits are required for refills and patient safety.  Educated patient will get 1 month supply and will call for refills.  Patient given TRT policy and agreed to follow policy.  We discussed the decreased fertility risk with proceeding with testosterone.  Educated on proper injection technique and patient demonstrated understanding.  Wife is a nurse and is able to give injections.  Advised to contact office with any questions.     I explained the risks, benefits, and alternatives to testosterone therapies. I informed him of the potential SEs of chest and leg swelling, increased acne, change in mood, polycythemia, and worsening of undiagnosed prostate cancer.  Discussed treatment options for polycythemia.     He wishes to proceed with a trial of xyosted 75 mg SubQ weekly-send to [facility], pending labs.  If xyosted not covered wishes to trial  mg IM weekly.  Obtain LH, Hgb/Hct, PSA, and TT/FT today.      I discussed treatment options including oral PDE5- medication, intra-urethral suppositories, pharmacologic injections, vacuum erection devices and implantable penile prostheses.    The patient has been on sildenafil 50 mg PRN but has not had this refilled.  He is satisfied with erections on sildenafil.  He was counseled on appropriate use, timing and the need for sexual stimulation.  In addition, he  understands not to use this medication with nitrates. He was instructed to take the medication about 1 hour prior to sexual activity.  Side effects were explained to the patient such as headache, visual changes, upset stomach, and back pain. Lastly, he was instructed to go immediately to his nearest emergency room in the event he developed an erection lasting longer than 3 hours. He voiced understanding of all these instructions and the importance of seeking prompt medical attention for an erection lasting longer than 3 hours.     We also discussed association of erectile dysfunction and future coronary events. Erectile dysfunction can be a marker for future coronary events and usually precedes by 2-4 years.  Recommended he discuss EKG and possible stress test with his PCP and following his cholesterol.  . Recommended smoking cessation as well.     Plan  He wishes to proceed with a trial of xyosted 75 mg SubQ weekly-send to [facility], pending labs.  If xyosted not covered wishes to trial  mg IM weekly  Start sildenafil 50 mg PO PRN 1 hour prior to intercourse on empty stomach  Hgb/Hct and TT in 3 months  Follow up in 3 months with labs prior    Follow Up:   Return in about 3 months (around 1/25/2025) for recheck with Chastity, labs prior.      MILAGROS Reynoso  [facility] [place]

## 2024-10-30 ENCOUNTER — TELEPHONE (OUTPATIENT)
Dept: UROLOGY | Facility: CLINIC | Age: 64
End: 2024-10-30
Payer: MEDICARE

## 2024-10-30 DIAGNOSIS — E29.1 HYPOGONADISM IN MALE: Primary | ICD-10-CM

## 2024-10-30 LAB
TESTOST FREE SERPL-MCNC: <0.2 PG/ML (ref 6.6–18.1)
TESTOST SERPL-MCNC: 83 NG/DL (ref 264–916)

## 2024-10-30 RX ORDER — TESTOSTERONE CYPIONATE 200 MG/ML
100 INJECTION, SOLUTION INTRAMUSCULAR WEEKLY
Qty: 4 ML | Refills: 0 | Status: SHIPPED | OUTPATIENT
Start: 2024-10-30

## 2024-10-30 RX ORDER — BLOOD PRESSURE TEST KIT
KIT MISCELLANEOUS
Qty: 100 EACH | Refills: 11 | Status: SHIPPED | OUTPATIENT
Start: 2024-10-30

## 2024-11-21 DIAGNOSIS — E29.1 HYPOGONADISM IN MALE: ICD-10-CM

## 2024-11-22 DIAGNOSIS — E29.1 HYPOGONADISM IN MALE: Primary | ICD-10-CM

## 2024-11-22 RX ORDER — TESTOSTERONE CYPIONATE 200 MG/ML
100 INJECTION, SOLUTION INTRAMUSCULAR WEEKLY
Qty: 4 ML | Refills: 0 | Status: SHIPPED | OUTPATIENT
Start: 2024-11-22

## 2025-01-13 ENCOUNTER — LAB (OUTPATIENT)
Dept: LAB | Facility: HOSPITAL | Age: 65
End: 2025-01-13
Payer: MEDICARE

## 2025-01-13 PROCEDURE — 84402 ASSAY OF FREE TESTOSTERONE: CPT | Performed by: NURSE PRACTITIONER

## 2025-01-13 PROCEDURE — 85014 HEMATOCRIT: CPT | Performed by: NURSE PRACTITIONER

## 2025-01-13 PROCEDURE — 84403 ASSAY OF TOTAL TESTOSTERONE: CPT | Performed by: NURSE PRACTITIONER

## 2025-01-13 PROCEDURE — 85018 HEMOGLOBIN: CPT | Performed by: NURSE PRACTITIONER

## 2025-01-21 ENCOUNTER — OFFICE VISIT (OUTPATIENT)
Dept: UROLOGY | Facility: CLINIC | Age: 65
End: 2025-01-21
Payer: MEDICARE

## 2025-01-21 VITALS
DIASTOLIC BLOOD PRESSURE: 78 MMHG | OXYGEN SATURATION: 100 % | SYSTOLIC BLOOD PRESSURE: 128 MMHG | BODY MASS INDEX: 19.89 KG/M2 | HEART RATE: 68 BPM | WEIGHT: 155 LBS | HEIGHT: 74 IN

## 2025-01-21 DIAGNOSIS — E29.1 HYPOGONADISM IN MALE: Primary | ICD-10-CM

## 2025-01-21 RX ORDER — NIRMATRELVIR AND RITONAVIR 300-100 MG
KIT ORAL
COMMUNITY
Start: 2024-12-27

## 2025-01-21 RX ORDER — TESTOSTERONE CYPIONATE 200 MG/ML
50 INJECTION, SOLUTION INTRAMUSCULAR WEEKLY
Qty: 2 ML | Refills: 0 | Status: SHIPPED | OUTPATIENT
Start: 2025-01-21

## 2025-01-21 RX ORDER — LIDOCAINE 36 MG/1
PATCH TOPICAL
COMMUNITY
Start: 2024-11-21

## 2025-01-21 RX ORDER — AMMONIUM LACTATE 12 G/100G
CREAM TOPICAL DAILY
COMMUNITY
Start: 2025-01-09 | End: 2026-01-09

## 2025-01-21 NOTE — PROGRESS NOTES
Office Visit     Patient Name: Zaid Olivo Jr.  : 1960   MRN: [id number]     Chief Complaint:   Chief Complaint   Patient presents with    Follow-up     Testosterone       Referring Provider: No ref. provider found    Primary Care Provider: Reinaldo Flynn MD     History of Present Illness: Zaid Olivo Jr. is a 64 y.o. male presents for hypogonadism follow up on TRT.  Injects on Thursday  mg IM weekly.  Feels he is doing really well on current regimen.  Improvement in energy and sex drive.  Reports viagra is working and he is able to penetrate.      Subjective   Review of System:   As noted in HPI.    Past Medical History:   Diagnosis Date    Arthritis     Carpal tunnel syndrome     Coronary artery disease     Disease of thyroid gland     Erectile dysfunction     Finger fracture     Gout     Hemorrhoids     High cholesterol     Hyperlipidemia     Hypogonadism in male     Osteoarthritis     Osteomyelitis     Osteoporosis     Prediabetes      Past Surgical History:   Procedure Laterality Date    BACK SURGERY      FOOT SURGERY      VASECTOMY       Family History   Problem Relation Age of Onset    Heart disease Mother     Osteoarthritis Mother     Osteoporosis Mother     Diabetes Mother     Diabetes Father     Gout Father     Cancer Father     Heart disease Father     Diabetes Brother     Heart disease Brother     Heart disease Sister      Social History     Socioeconomic History    Marital status:    Tobacco Use    Smoking status: Every Day     Current packs/day: 1.00     Average packs/day: 1 pack/day for 52.1 years (52.1 ttl pk-yrs)     Types: Cigarettes     Start date: 1973     Passive exposure: Current    Smokeless tobacco: Never    Tobacco comments:     has tried   Vaping Use    Vaping status: Never Used   Substance and Sexual Activity    Alcohol use: No    Drug use: No    Sexual activity: Not Currently     Partners: Female     Birth control/protection: Vasectomy  "      Current Outpatient Medications:     Alcohol Swabs pads, Clean area prior to injection, Disp: 100 each, Rfl: 11    alendronate (Fosamax) 70 MG tablet, Fosamax 70 mg oral tablet take 1 tablet (70 mg) by oral route once weekly in the morning, at least 30 min before first food, beverage, or medication of day   Active, Disp: , Rfl:     ammonium lactate (AMLACTIN) 12 % cream, Apply  topically to the appropriate area as directed Daily., Disp: , Rfl:     aspirin 325 MG tablet, Take 1 tablet by mouth Daily., Disp: , Rfl:     atorvastatin (LIPITOR) 40 MG tablet, Take 1 tablet by mouth Daily., Disp: , Rfl:     DULoxetine (CYMBALTA) 60 MG capsule, Take 1 capsule by mouth Daily., Disp: , Rfl:     HYDROcodone-acetaminophen (NORCO) 7.5-325 MG per tablet, Take 1 tablet by mouth Every 6 (Six) Hours As Needed for Moderate Pain., Disp: , Rfl:     levothyroxine (SYNTHROID, LEVOTHROID) 125 MCG tablet, Take 1 tablet by mouth Daily., Disp: , Rfl:     Lidocaine (ZTlido) 1.8 %, APPLY 1 PATCH BY TOPICAL ROUTE ONCE DAILY (MAY WEAR UP TO 12 HOURS.), Disp: , Rfl:     Needle, Disp, 18G X 1-1/2\" misc, Use for drawing up the medication, Disp: 50 each, Rfl: 3    Needle, Disp, 23G X 1-1/2\" misc, Use 1 Device 1 (One) Time Per Week., Disp: 30 each, Rfl: 11    Paxlovid, 300/100,, , Disp: , Rfl:     sildenafil (Viagra) 25 MG tablet, Take 1 tablet by mouth Daily As Needed for Erectile Dysfunction. Take 1 hour prior to intercourse on empty stomach, Disp: 30 tablet, Rfl: 5    Syringe, Disposable, 3 ML misc, Use 1 syringe 1 (One) Time Per Week., Disp: 100 each, Rfl: 11    Testosterone Cypionate (DEPOTESTOTERONE CYPIONATE) 200 MG/ML injection, Inject 0.25 mL into the appropriate muscle as directed by prescriber 1 (One) Time Per Week. Inject 0.25 mL into the appropriate muscle as directed once weekly, discard remainder of vial. Call for refills with last injection, Disp: 2 mL, Rfl: 0    Allergies   Allergen Reactions    Penicillins Other (See Comments) " "    Unsure reaction     Objective   Visit Vitals  /78   Pulse 68   Ht 188 cm (74.02\")   Wt 70.3 kg (155 lb)   SpO2 100%   BMI 19.89 kg/m²        Body mass index is 19.89 kg/m².     Physical Exam  Vitals and nursing note reviewed.   Constitutional:       General: He is awake. He is not in acute distress.     Appearance: He is not ill-appearing.   Pulmonary:      Effort: Pulmonary effort is normal.   Skin:     General: Skin is warm and dry.   Neurological:      Mental Status: He is alert and oriented to person, place, and time. Mental status is at baseline.      Gait: Gait abnormal (steady with cane).   Psychiatric:         Mood and Affect: Mood normal.         Behavior: Behavior is cooperative.         Labs  Lab Results   Component Value Date    HCT 43.4 01/13/2025    HCT 40.5 10/25/2024     Lab Results   Component Value Date    HGB 14.2 01/13/2025    HGB 13.4 10/25/2024     Lab Results   Component Value Date    TESTOSTEROTT 1216 (H) 01/13/2025    TESTOSTEROTT 83 (L) 10/25/2024     Lab Results   Component Value Date    TESTFRE 16.5 01/13/2025    TESTFRE <0.2 (L) 10/25/2024     Lab Results   Component Value Date    PSA 0.876 10/25/2024    LH 3.21 10/25/2024       I have reviewed the above labs.    Assessment / Plan    Assessment:   Diagnoses and all orders for this visit:    1. Hypogonadism in male (Primary)  -     Testosterone; Future  -     Hemoglobin & Hematocrit, Blood; Future  -     Testosterone Cypionate (DEPOTESTOTERONE CYPIONATE) 200 MG/ML injection; Inject 0.25 mL into the appropriate muscle as directed by prescriber 1 (One) Time Per Week. Inject 0.25 mL into the appropriate muscle as directed once weekly, discard remainder of vial. Call for refills with last injection  Dispense: 2 mL; Refill: 0  -     Needle, Disp, 23G X 1-1/2\" misc; Use 1 Device 1 (One) Time Per Week.  Dispense: 30 each; Refill: 11  -     Needle, Disp, 18G X 1-1/2\" misc; Use for drawing up the medication  Dispense: 50 each; Refill: " 3  -     Syringe, Disposable, 3 ML misc; Use 1 syringe 1 (One) Time Per Week.  Dispense: 100 each; Refill: 11  -     Cancel: Estradiol; Future         Zaid Olivo Jr. is a 64 y.o. male presents for hypogonadism follow up on TRT.  Injects on Thursday  mg IM weekly.  Feels he is doing really well on current regimen.  Improvement in energy and sex drive.  Reports viagra is working and he is able to penetrate.      TT supratherapeutic, decrease TC to 50 mg IM weekly.  Hct <50.  Patient wishes to follow up in 6 months with labs.  Advised if he feels this dose is too low around 3 months to reach out and we can obtain labs at that time.      Patient reviewed and signed zero tolerance policy, [facility] testosterone therapy policy, and FAQs of testosterone replacement therapy.  All questions were answered and patient agreed to follow policy.  Patient was given signed copies of zero tolerance policy, [facility] testosterone therapy policy, and FAQs of testosterone replacement therapy for reference.      Obtain TT, Hgb/Hct before 10 am, 4 days after last injection.    Follow up in 6 months with labs prior.      Plan:  Follow up in 6 months with labs prior as directed  Hgb/Hct, TT prior to follow up  Decrease TC to 50 mg IM weekly, new Rx sent.       Follow Up:   Return in about 6 months (around 7/21/2025) for recheck with Yolanda labs prior.    Yolanda Mcnally, MSN, APRN, FNP-C  [facility]

## 2025-03-12 DIAGNOSIS — E29.1 HYPOGONADISM IN MALE: ICD-10-CM

## 2025-03-12 DIAGNOSIS — N52.9 ERECTILE DYSFUNCTION, UNSPECIFIED ERECTILE DYSFUNCTION TYPE: ICD-10-CM

## 2025-03-12 NOTE — TELEPHONE ENCOUNTER
Caller: SHALOM CLARK    Relationship: SE;F    Best call back number: [phone]    Requested Prescriptions:   Requested Prescriptions     Pending Prescriptions Disp Refills    Testosterone Cypionate (DEPOTESTOTERONE CYPIONATE) 200 MG/ML injection 2 mL 0     Sig: Inject 0.25 mL into the appropriate muscle as directed by prescriber 1 (One) Time Per Week. Inject 0.25 mL into the appropriate muscle as directed once weekly, discard remainder of vial. Call for refills with last injection    Syringe, Disposable, 3 ML misc 100 each 11     Sig: Use 1 syringe 1 (One) Time Per Week.        Pharmacy where request should be sent: [address] [phone] [facility] [phone]      Last office visit with prescribing clinician: 1/21/2025   Last telemedicine visit with prescribing clinician: Visit date not found   Next office visit with prescribing clinician: 7/24/2025       Does the patient have less than a 3 day supply:  [x] Yes  [] No    Would you like a call back once the refill request has been completed: [x] Yes [] No    If the office needs to give you a call back, can they leave a voicemail: [x] Yes [] No    Zenaida Ernandez-Page   03/12/25 11:03 EDT

## 2025-03-13 RX ORDER — TESTOSTERONE CYPIONATE 200 MG/ML
50 INJECTION, SOLUTION INTRAMUSCULAR WEEKLY
Qty: 2 ML | Refills: 0 | Status: SHIPPED | OUTPATIENT
Start: 2025-03-13 | End: 2025-03-16 | Stop reason: SDUPTHER

## 2025-03-16 DIAGNOSIS — E29.1 HYPOGONADISM IN MALE: ICD-10-CM

## 2025-03-16 RX ORDER — TESTOSTERONE CYPIONATE 200 MG/ML
50 INJECTION, SOLUTION INTRAMUSCULAR WEEKLY
Qty: 1 ML | Refills: 0 | Status: SHIPPED | OUTPATIENT
Start: 2025-03-16

## 2025-06-10 ENCOUNTER — TELEPHONE (OUTPATIENT)
Dept: UROLOGY | Facility: CLINIC | Age: 65
End: 2025-06-10

## 2025-06-10 NOTE — TELEPHONE ENCOUNTER
Provider will be out of the office on 7/24/2025.  [facility]  Hub can read and reschedule.

## 2025-07-03 DIAGNOSIS — E29.1 HYPOGONADISM IN MALE: ICD-10-CM

## 2025-07-07 RX ORDER — TESTOSTERONE CYPIONATE 200 MG/ML
50 INJECTION, SOLUTION INTRAMUSCULAR
Qty: 1 ML | Refills: 0 | Status: SHIPPED | OUTPATIENT
Start: 2025-07-07 | End: 2025-08-04

## 2025-07-20 DIAGNOSIS — E29.1 HYPOGONADISM IN MALE: ICD-10-CM

## 2025-07-21 ENCOUNTER — LAB (OUTPATIENT)
Dept: LAB | Facility: HOSPITAL | Age: 65
End: 2025-07-21
Payer: MEDICARE

## 2025-07-21 PROCEDURE — 85018 HEMOGLOBIN: CPT | Performed by: NURSE PRACTITIONER

## 2025-07-21 PROCEDURE — 84403 ASSAY OF TOTAL TESTOSTERONE: CPT | Performed by: NURSE PRACTITIONER

## 2025-07-21 PROCEDURE — 85014 HEMATOCRIT: CPT | Performed by: NURSE PRACTITIONER

## 2025-07-23 ENCOUNTER — OFFICE VISIT (OUTPATIENT)
Dept: UROLOGY | Facility: CLINIC | Age: 65
End: 2025-07-23
Payer: MEDICARE

## 2025-07-23 VITALS
BODY MASS INDEX: 19.89 KG/M2 | HEART RATE: 75 BPM | WEIGHT: 155 LBS | RESPIRATION RATE: 14 BRPM | TEMPERATURE: 97.6 F | OXYGEN SATURATION: 98 % | HEIGHT: 74 IN | DIASTOLIC BLOOD PRESSURE: 78 MMHG | SYSTOLIC BLOOD PRESSURE: 138 MMHG

## 2025-07-23 DIAGNOSIS — N42.9 PROSTATE DISORDER: ICD-10-CM

## 2025-07-23 DIAGNOSIS — E29.1 HYPOGONADISM IN MALE: Primary | ICD-10-CM

## 2025-07-23 RX ORDER — SYRINGE WITH NEEDLE, 1 ML 25GX5/8"
SYRINGE, EMPTY DISPOSABLE MISCELLANEOUS
COMMUNITY
Start: 2025-07-10

## 2025-07-23 RX ORDER — OXYCODONE AND ACETAMINOPHEN 10; 325 MG/1; MG/1
TABLET ORAL
COMMUNITY
Start: 2025-07-14

## 2025-07-23 RX ORDER — ALBUTEROL SULFATE 90 UG/1
INHALANT RESPIRATORY (INHALATION)
COMMUNITY
Start: 2025-07-05

## 2025-07-23 NOTE — PROGRESS NOTES
Office Visit     Patient: Zaid Olivo Jr. 65 y.o. male   : 1960   MRN: [id number]     Patient or patient representative verbalized consent for the use of Ambient Listening during the visit with  MILAGROS Reynoso for chart documentation. 2025  09:31 EDT    Chief Complaint   Patient presents with    Follow-up     Referring Provider: No ref. provider found    Primary Care Provider: Reinaldo Flynn MD     History of Present Illness  The patient presents for evaluation of testosterone management and osteopenia.    Testosterone Management  - He reports general improvement on 50 mg of testosterone cypionate per week, with no breast tenderness, growth, or urinary issues.  - Testosterone level is 720, hematocrit is 39, indicating no polycythemia.  - No family history of prostate cancer.  - His wife manages his medication, and he is unsure about the dosage.    Osteopenia  - He has recurrent fractures for 17 years, recently fracturing while standing.  - Bone scan shows osteopenia, with 1 out of 5 readings indicating a score of 5.  - Previously on Fosamax, recommend continuation    Erectile Dysfunction  - Satisfied with sildenafil 25 mg PRN    FAMILY HISTORY  He reports no family history of prostate cancer.           Subjective   Review of System:   As noted in HPI.    Past Medical History:   Diagnosis Date    Arthritis     Carpal tunnel syndrome     Coronary artery disease     Disease of thyroid gland     Erectile dysfunction     Finger fracture     Gout     Hemorrhoids     High cholesterol     Hyperlipidemia     Hypogonadism in male     Osteoarthritis     Osteomyelitis     Osteoporosis     Prediabetes      Past Surgical History:   Procedure Laterality Date    BACK SURGERY      FOOT SURGERY      VASECTOMY       Family History   Problem Relation Age of Onset    Heart disease Mother     Osteoarthritis Mother     Osteoporosis Mother     Diabetes Mother     Diabetes Father     Gout Father     Cancer  "Father     Heart disease Father     Diabetes Brother     Heart disease Brother     Heart disease Sister      Social History     Socioeconomic History    Marital status:    Tobacco Use    Smoking status: Every Day     Current packs/day: 1.00     Average packs/day: 1 pack/day for 52.6 years (52.6 ttl pk-yrs)     Types: Cigarettes     Start date: 1/1/1973     Passive exposure: Current    Smokeless tobacco: Never    Tobacco comments:     has tried   Vaping Use    Vaping status: Never Used   Substance and Sexual Activity    Alcohol use: No    Drug use: No    Sexual activity: Not Currently     Partners: Female     Birth control/protection: Vasectomy       Current Outpatient Medications:     albuterol sulfate  (90 Base) MCG/ACT inhaler, , Disp: , Rfl:     atorvastatin (LIPITOR) 40 MG tablet, Take 1 tablet by mouth Daily., Disp: , Rfl:     BD Plastipak Syringe 21G X 1\" 3 ML misc, , Disp: , Rfl:     cholecalciferol (VITAMIN D3) 1.25 MG (42213 UT) capsule, Take 1 capsule by mouth Every 7 (Seven) Days., Disp: , Rfl:     DULoxetine (CYMBALTA) 60 MG capsule, Take 1 capsule by mouth Daily., Disp: , Rfl:     levothyroxine (SYNTHROID, LEVOTHROID) 125 MCG tablet, Take 1 tablet by mouth Daily., Disp: , Rfl:     Needle, Disp, (B-D BLUNT FILL NEEDLE) 18G X 1-1/2\" misc, use to draw up testosterone, Disp: 50 each, Rfl: 3    Needle, Disp, 23G X 1-1/2\" misc, Use 1 Device 1 (One) Time Per Week., Disp: 30 each, Rfl: 11    oxyCODONE-acetaminophen (PERCOCET)  MG per tablet, Take 1 tablet 5 times a day by oral route as directed for 30 days, for severe pain.., Disp: , Rfl:     Syringe, Disposable, 3 ML misc, Use 1 syringe 1 (One) Time Per Week., Disp: 100 each, Rfl: 11    Testosterone Cypionate (DEPOTESTOTERONE CYPIONATE) 200 MG/ML injection, Inject 0.25 mL into the appropriate muscle as directed by prescriber Every 7 (Seven) Days for 28 days., Disp: 1 mL, Rfl: 0    sildenafil (Viagra) 25 MG tablet, Take 1 tablet by mouth " "Daily As Needed for Erectile Dysfunction. Take 1 hour prior to intercourse on empty stomach, Disp: 30 tablet, Rfl: 5    Allergies   Allergen Reactions    Penicillins Other (See Comments)     Unsure reaction     Objective   Visit Vitals  /78 (BP Location: Left arm, Patient Position: Sitting, Cuff Size: Adult)   Pulse 75   Temp 97.6 °F (36.4 °C) (Temporal)   Resp 14   Ht 188 cm (74.02\")   Wt 70.3 kg (155 lb)   SpO2 98%   BMI 19.89 kg/m²      Body mass index is 19.89 kg/m².     Physical Exam  Vitals and nursing note reviewed.   Constitutional:       General: He is awake. He is not in acute distress.     Appearance: He is not ill-appearing.   Pulmonary:      Effort: Pulmonary effort is normal.   Skin:     General: Skin is warm and dry.   Neurological:      Mental Status: He is alert and oriented to person, place, and time. Mental status is at baseline.   Psychiatric:         Mood and Affect: Mood normal.         Behavior: Behavior is cooperative.          Labs  No results found for: \"COLORU\", \"CLARITYU\", \"SPECGRAV\", \"PHUR\", \"LEUKOCYTESUR\", \"NITRITE\", \"PROTEINPOCUA\", \"GLUCOSEUR\", \"KETONESU\", \"UROBILINOGEN\", \"BILIRUBINUR\", \"RBCUR\"   No results found for: \"WBCUA\", \"RBCUA\", \"BACTERIA\", \"HYALCASTU\", \"SQUAMEPIUA\"     Lab Results   Component Value Date    HGB 13.3 07/21/2025    HCT 39.9 07/21/2025     Lab Results   Component Value Date    GLUCOSE 105 07/11/2017       No results found for: \"HGBA1C\"     Lab Results   Component Value Date    PSA 0.876 10/25/2024    PSA 1.43 04/09/2014       No results found for: \"URICACIDSTN\", \"[id number]\", \"HTXN0GUDJA\", \"LABMAGN\", \"CAPHOSSTONE\", \"HYDROXYAPATI\"  No results found for: \"WBXD79XK\", \"CAION\", \"PTH\", \"URICACID\"  No results found for: \"CYSTINE\", \"URINEVOLUM\", \"CALCIUMUR\", \"OXALATEU\", \"CITRATEUR\", \"LABPH\", \"URICUR\", \"NAUR\", \"KUR\", \"MAGNESIUMUR\", \"PHOSUR\", \"AMMONIUMUR\", \"CLUR\", \"[id number]\", \"UREAUR\", \"LABCREAUR\", \"CAOXSAT\", \"PROTEINCATRT\"    Lab Results   Component Value Date    " HCT 39.9 07/21/2025    HCT 43.4 01/13/2025    HCT 40.5 10/25/2024     Lab Results   Component Value Date    HGB 13.3 07/21/2025    HGB 14.2 01/13/2025    HGB 13.4 10/25/2024     Lab Results   Component Value Date    TESTOSTERONE 720.00 07/21/2025    TESTOSTERONE 269 04/09/2014       Lab Results   Component Value Date    TESTOSTEROTT 1216 (H) 01/13/2025    TESTOSTEROTT 83 (L) 10/25/2024       Lab Results   Component Value Date    LH 3.21 10/25/2024       I have reviewed the above labs.        Assessment / Plan      Diagnoses and all orders for this visit:    1. Hypogonadism in male (Primary)  -     Hemoglobin & Hematocrit, Blood; Future  -     Testosterone; Future  -     PSA DIAGNOSTIC; Future    2. Prostate disorder  -     PSA DIAGNOSTIC; Future         Assessment & Plan  Testosterone management  - Testosterone levels are 720, hematocrit 39, no polycythemia  - Continue 50 mg testosterone cypionate weekly  - Viagra dosage can be increased up to 4 tablets in 24 hours if needed, gradually increase  - Will see if we can add PSA to recent specimen, if not repeat on follow up for annual screening.  - No breast tenderness or growth, energy levels good    Osteopenia  - Diagnosed with osteopenia  - Recommend continuation of Fosamax    Follow-up  - Scheduled in 6 months       Return in about 6 months (around 1/23/2026) for f/u with Yolanda w/labs, -TRT.    Yolanda Mcnally, MSN, APRN, FNP-C  [facility]

## 2025-07-28 DIAGNOSIS — E29.1 HYPOGONADISM IN MALE: ICD-10-CM

## 2025-07-29 RX ORDER — TESTOSTERONE CYPIONATE 200 MG/ML
INJECTION, SOLUTION INTRAMUSCULAR
Qty: 1 ML | Refills: 0 | Status: SHIPPED | OUTPATIENT
Start: 2025-07-29 | End: 2025-07-30

## 2025-07-30 ENCOUNTER — TELEPHONE (OUTPATIENT)
Dept: UROLOGY | Facility: CLINIC | Age: 65
End: 2025-07-30
Payer: MEDICARE

## 2025-07-30 DIAGNOSIS — E29.1 HYPOGONADISM IN MALE: ICD-10-CM

## 2025-07-30 RX ORDER — TESTOSTERONE CYPIONATE 200 MG/ML
50 INJECTION, SOLUTION INTRAMUSCULAR WEEKLY
Qty: 1 ML | Refills: 0 | Status: SHIPPED | OUTPATIENT
Start: 2025-07-30

## 2025-07-30 NOTE — TELEPHONE ENCOUNTER
Provider: ROLANDO SEVILLA    Caller: [facility] - [place], [place] - [address] - [phone] [facility] [phone]     Relationship to Patient: Pharmacy      Reason for Call: NEED TO CONFIRM DOSAGE ON TESTOSTERONE- IF IT SHOULD BE ML.      UNABLE TO WARM LEES TO CLINICAL,PLEASE CALL.

## 2025-08-28 DIAGNOSIS — E29.1 HYPOGONADISM IN MALE: ICD-10-CM

## 2025-08-29 RX ORDER — TESTOSTERONE CYPIONATE 200 MG/ML
INJECTION, SOLUTION INTRAMUSCULAR
Qty: 4 ML | Refills: 0 | Status: SHIPPED | OUTPATIENT
Start: 2025-08-29